# Patient Record
Sex: MALE | Race: WHITE | URBAN - METROPOLITAN AREA
[De-identification: names, ages, dates, MRNs, and addresses within clinical notes are randomized per-mention and may not be internally consistent; named-entity substitution may affect disease eponyms.]

---

## 2017-10-19 ENCOUNTER — IMPORTED ENCOUNTER (OUTPATIENT)
Dept: URBAN - METROPOLITAN AREA CLINIC 9 | Facility: CLINIC | Age: 77
End: 2017-10-19

## 2018-12-06 ENCOUNTER — IMPORTED ENCOUNTER (OUTPATIENT)
Dept: URBAN - METROPOLITAN AREA CLINIC 9 | Facility: CLINIC | Age: 78
End: 2018-12-06

## 2020-02-24 ENCOUNTER — IMPORTED ENCOUNTER (OUTPATIENT)
Dept: URBAN - METROPOLITAN AREA CLINIC 9 | Facility: CLINIC | Age: 80
End: 2020-02-24

## 2021-02-09 NOTE — PATIENT DISCUSSION
Discussed possibility of worsening with cat sx necessitating vitreoretinal surgery. Pt motivated to have cat sx.

## 2021-02-09 NOTE — PATIENT DISCUSSION
Retinal consult necessary. May require treatment prior to or immediately following cataract surgery.

## 2021-02-16 NOTE — PATIENT DISCUSSION
The types of intraocular lenses were reviewed with the patient along with a discussion of their various strengths and weaknesses. Patient a candidate for all lens options if ADV will do Vivity.

## 2021-02-16 NOTE — PATIENT DISCUSSION
3/16/21 UPDATE JOD: Patient had retina consult with TB who recommends monofocal only OS. Called patient to discuss that we will proceed with CV (eyhance) tgt -1.00 OS. Patient understands and wishes to proceed.

## 2021-02-16 NOTE — PATIENT DISCUSSION
3/10/21 UPDATE: EARNEST called and spoke with patient. Will have patient follow up with retina prior to proceeding with cataract surgery in the left eye for clearance and guidance on lens choice for OS. Discussed with patient that she could switch to custom vision and tgt near but patient elects to proceed with Vivity OD and based on TB visit, will decide on lens options OS. TB agrees to see patient for PO same day as retina consult. Will keep ref/reassess with LAV.

## 2021-02-16 NOTE — PATIENT DISCUSSION
If patient elects CV will aim for -1.25 to -1.50, if the patient elects Jj Mercado will aim for -1.00. Explained to patient that if she chooses CV she would loses depth of focus.

## 2021-03-12 NOTE — PATIENT DISCUSSION
If patient elects CV will aim for -1.25 to -1.50, if the patient elects [place] [employer] will aim for -1.00. Explained to patient that if she chooses CV she would loses depth of focus.

## 2021-03-12 NOTE — PATIENT DISCUSSION
If patient elects CV will aim for -1.25 to -1.50, if the patient elects [address] will aim for -1.00. Explained to patient that if she chooses CV she would loses depth of focus.

## 2021-03-12 NOTE — PATIENT DISCUSSION
If patient elects CV will aim for -1.25 to -1.50, if the patient elects Columba Tamiko will aim for -1.00. Explained to patient that if she chooses CV she would loses depth of focus.

## 2021-03-15 NOTE — PATIENT DISCUSSION
Membrane is borderline visually significant.  May likely need PPV/MP following cataract surgery, will re evaluate after cataract surgery.

## 2021-03-19 NOTE — PATIENT DISCUSSION
Patient understands Pred taper on top of combo drop. QID for 2 weeks, TID for 2 weeks, BID for 2 weeks, QDay for 2 weeks, then D/C.

## 2021-04-06 NOTE — PROCEDURE NOTE: CLINICAL
PROCEDURE NOTE: Punctal Plugs, Amy Garcia ([id number], [id number]) #2 Bilateral Lower Lids. Prior to treatment, the risks/benefits/alternatives were discussed. The patient wished to proceed with procedure. Temporary collagen plugs were inserted. Patient tolerated procedure well. There were no complications. Post procedure instructions given. [place]

## 2021-04-26 NOTE — PATIENT DISCUSSION
Patient is doing well post-operatively. The importance of post-op drop compliance was emphasized. Drop schedule reviewed with patient. Patient to call if any visual changes or concerns. Using Prednisolone acetate 1% BID and tapering.

## 2021-04-28 ENCOUNTER — IMPORTED ENCOUNTER (OUTPATIENT)
Dept: URBAN - METROPOLITAN AREA CLINIC 9 | Facility: CLINIC | Age: 81
End: 2021-04-28

## 2021-04-28 PROBLEM — H35.3111: Noted: 2021-04-28

## 2021-04-28 PROBLEM — E11.9: Noted: 2021-04-28

## 2021-06-21 ENCOUNTER — IMPORTED ENCOUNTER (OUTPATIENT)
Dept: URBAN - METROPOLITAN AREA CLINIC 9 | Facility: CLINIC | Age: 81
End: 2021-06-21

## 2021-07-21 ENCOUNTER — IMPORTED ENCOUNTER (OUTPATIENT)
Dept: URBAN - METROPOLITAN AREA CLINIC 9 | Facility: CLINIC | Age: 81
End: 2021-07-21

## 2021-08-04 ENCOUNTER — IMPORTED ENCOUNTER (OUTPATIENT)
Dept: URBAN - METROPOLITAN AREA CLINIC 9 | Facility: CLINIC | Age: 81
End: 2021-08-04

## 2021-10-17 ASSESSMENT — KERATOMETRY
OD_K2POWER_DIOPTERS: 45
OD_K1POWER_DIOPTERS: 44.75
OD_K2POWER_DIOPTERS: 45.75
OS_AXISANGLE2_DEGREES: 77
OD_AXISANGLE2_DEGREES: 101
OD_AXISANGLE2_DEGREES: 165
OD_K1POWER_DIOPTERS: 45
OS_AXISANGLE_DEGREES: 163
OD_K2POWER_DIOPTERS: 44.75
OD_K1POWER_DIOPTERS: 44.5
OS_K1POWER_DIOPTERS: 44.75
OS_AXISANGLE_DEGREES: 180
OS_K2POWER_DIOPTERS: 45
OD_K2POWER_DIOPTERS: 45
OS_AXISANGLE_DEGREES: 177
OD_AXISANGLE_DEGREES: 11
OS_AXISANGLE_DEGREES: 167
OD_AXISANGLE_DEGREES: 75
OS_K1POWER_DIOPTERS: 45
OS_K1POWER_DIOPTERS: 44.75
OD_K1POWER_DIOPTERS: 44.5
OS_K2POWER_DIOPTERS: 45.25
OS_K2POWER_DIOPTERS: 45.5
OS_AXISANGLE2_DEGREES: 90
OD_AXISANGLE_DEGREES: 14
OD_AXISANGLE_DEGREES: 64
OS_K1POWER_DIOPTERS: 44.75
OS_AXISANGLE2_DEGREES: 73
OS_AXISANGLE2_DEGREES: 87
OD_AXISANGLE2_DEGREES: 154
OD_AXISANGLE2_DEGREES: 104
OS_K2POWER_DIOPTERS: 45.25

## 2021-10-17 ASSESSMENT — VISUAL ACUITY
OD_CC: 20/25 - SN
OD_CC: 20/40 SN
OS_CC: 20/40 -2 SN
OS_CC: 20/25 - SN
OS_CC: 20/25 SN
OS_CC: 20/40 SN
OD_CC: 20/25 SN
OD_CC: 20/30 - SN
OD_CC: 20/25 - SN
OD_SC: 20/50 + SN
OD_CC: 20/40 -2 SN
OS_CC: 20/60 SN
OD_CC: 20/30 +2 SN
OD_SC: 20/30 - SN
OS_CC: 20/50 SN
OD_CC: 20/25 -2 SN
OD_CC: 20/25 - SN
OS_CC: 20/25 - SN
OS_CC: 20/30 SN

## 2021-10-17 ASSESSMENT — TONOMETRY
OS_IOP_MMHG: 13
OS_IOP_MMHG: 19
OD_IOP_MMHG: 12
OD_IOP_MMHG: 15
OS_IOP_MMHG: 16
OD_IOP_MMHG: 18
OD_IOP_MMHG: 13
OD_IOP_MMHG: 16
OD_IOP_MMHG: 19
OS_IOP_MMHG: 12

## 2022-07-05 ENCOUNTER — ESTABLISHED PATIENT (OUTPATIENT)
Dept: URBAN - METROPOLITAN AREA CLINIC 4 | Facility: CLINIC | Age: 82
End: 2022-07-05

## 2022-07-05 DIAGNOSIS — E11.9: ICD-10-CM

## 2022-07-05 DIAGNOSIS — H11.443: ICD-10-CM

## 2022-07-05 DIAGNOSIS — H35.3111: ICD-10-CM

## 2022-07-05 PROCEDURE — 92014 COMPRE OPH EXAM EST PT 1/>: CPT

## 2022-07-05 PROCEDURE — 92134 CPTRZ OPH DX IMG PST SGM RTA: CPT

## 2022-07-05 PROCEDURE — 92015 DETERMINE REFRACTIVE STATE: CPT

## 2022-07-05 ASSESSMENT — VISUAL ACUITY
OD_SC: 20/25-2
OD_SC: J7
OS_GLARE: 20/200
OU_SC: 20/25
OS_SC: J7
OS_SC: 20/70
OU_SC: J7

## 2022-07-05 ASSESSMENT — KERATOMETRY
OS_K1POWER_DIOPTERS: 45.0
OD_AXISANGLE_DEGREES: 54
OD_K1POWER_DIOPTERS: 45.0
OS_K2POWER_DIOPTERS: 45.75
OS_AXISANGLE_DEGREES: 174
OD_K2POWER_DIOPTERS: 45.25
OS_AXISANGLE2_DEGREES: 84
OD_AXISANGLE2_DEGREES: 144

## 2022-07-05 ASSESSMENT — TONOMETRY
OD_IOP_MMHG: 16
OS_IOP_MMHG: 16

## 2024-02-10 NOTE — PATIENT DISCUSSION
Patient understands condition, prognosis and need for follow up care. U/S PIV placement successful but no labs could be collected.

## 2024-05-23 NOTE — PATIENT DISCUSSION
Device company called and to inform us that pt monitor has no readings. If you have any questions call [phone].    Thanks  Leydi   No retinal detachment or retinal tear noted.